# Patient Record
Sex: MALE | Race: BLACK OR AFRICAN AMERICAN | NOT HISPANIC OR LATINO | URBAN - METROPOLITAN AREA
[De-identification: names, ages, dates, MRNs, and addresses within clinical notes are randomized per-mention and may not be internally consistent; named-entity substitution may affect disease eponyms.]

---

## 2024-05-13 ENCOUNTER — APPOINTMENT (RX ONLY)
Dept: URBAN - METROPOLITAN AREA CLINIC 1 | Facility: CLINIC | Age: 64
Setting detail: DERMATOLOGY
End: 2024-05-13

## 2024-05-13 DIAGNOSIS — L80 VITILIGO: ICD-10-CM | Status: INADEQUATELY CONTROLLED

## 2024-05-13 DIAGNOSIS — L29.89 OTHER PRURITUS: ICD-10-CM

## 2024-05-13 DIAGNOSIS — L81.5 LEUKODERMA, NOT ELSEWHERE CLASSIFIED: ICD-10-CM | Status: STABLE

## 2024-05-13 DIAGNOSIS — L82.1 OTHER SEBORRHEIC KERATOSIS: ICD-10-CM | Status: STABLE

## 2024-05-13 DIAGNOSIS — L81.4 OTHER MELANIN HYPERPIGMENTATION: ICD-10-CM | Status: STABLE

## 2024-05-13 DIAGNOSIS — D22 MELANOCYTIC NEVI: ICD-10-CM | Status: STABLE

## 2024-05-13 DIAGNOSIS — D18.0 HEMANGIOMA: ICD-10-CM | Status: STABLE

## 2024-05-13 DIAGNOSIS — L29.8 OTHER PRURITUS: ICD-10-CM

## 2024-05-13 DIAGNOSIS — Z12.83 ENCOUNTER FOR SCREENING FOR MALIGNANT NEOPLASM OF SKIN: ICD-10-CM

## 2024-05-13 PROBLEM — D18.01 HEMANGIOMA OF SKIN AND SUBCUTANEOUS TISSUE: Status: ACTIVE | Noted: 2024-05-13

## 2024-05-13 PROBLEM — D22.71 MELANOCYTIC NEVI OF RIGHT LOWER LIMB, INCLUDING HIP: Status: ACTIVE | Noted: 2024-05-13

## 2024-05-13 PROBLEM — D22.72 MELANOCYTIC NEVI OF LEFT LOWER LIMB, INCLUDING HIP: Status: ACTIVE | Noted: 2024-05-13

## 2024-05-13 PROBLEM — D22.4 MELANOCYTIC NEVI OF SCALP AND NECK: Status: ACTIVE | Noted: 2024-05-13

## 2024-05-13 PROBLEM — D22.5 MELANOCYTIC NEVI OF TRUNK: Status: ACTIVE | Noted: 2024-05-13

## 2024-05-13 PROBLEM — D22.61 MELANOCYTIC NEVI OF RIGHT UPPER LIMB, INCLUDING SHOULDER: Status: ACTIVE | Noted: 2024-05-13

## 2024-05-13 PROCEDURE — ? TREATMENT REGIMEN

## 2024-05-13 PROCEDURE — ? PRESCRIPTION MEDICATION MANAGEMENT

## 2024-05-13 PROCEDURE — ? FULL BODY SKIN EXAM

## 2024-05-13 PROCEDURE — ? DIAGNOSIS COMMENT

## 2024-05-13 PROCEDURE — 99204 OFFICE O/P NEW MOD 45 MIN: CPT

## 2024-05-13 PROCEDURE — ? PRESCRIPTION

## 2024-05-13 PROCEDURE — ? COUNSELING

## 2024-05-13 RX ORDER — HYDROXYZINE HYDROCHLORIDE 25 MG/1
TABLET, FILM COATED ORAL
Qty: 30 | Refills: 0 | Status: ERX | COMMUNITY
Start: 2024-05-13

## 2024-05-13 RX ORDER — TACROLIMUS 1 MG/G
OINTMENT TOPICAL
Qty: 60 | Refills: 0 | Status: ERX | COMMUNITY
Start: 2024-05-13

## 2024-05-13 RX ADMIN — TACROLIMUS: 1 OINTMENT TOPICAL at 00:00

## 2024-05-13 RX ADMIN — HYDROXYZINE HYDROCHLORIDE: 25 TABLET, FILM COATED ORAL at 00:00

## 2024-05-13 ASSESSMENT — LOCATION SIMPLE DESCRIPTION DERM
LOCATION SIMPLE: RIGHT UPPER ARM
LOCATION SIMPLE: ABDOMEN
LOCATION SIMPLE: LEFT FOREARM
LOCATION SIMPLE: RIGHT POSTERIOR THIGH
LOCATION SIMPLE: LEFT UPPER ARM
LOCATION SIMPLE: LEFT PLANTAR SURFACE
LOCATION SIMPLE: RIGHT SHOULDER
LOCATION SIMPLE: SCALP
LOCATION SIMPLE: RIGHT ANTERIOR NECK
LOCATION SIMPLE: RIGHT THIGH
LOCATION SIMPLE: RIGHT PLANTAR SURFACE
LOCATION SIMPLE: RIGHT BUTTOCK
LOCATION SIMPLE: LEFT CHEEK
LOCATION SIMPLE: RIGHT PRETIBIAL REGION
LOCATION SIMPLE: LEFT PRETIBIAL REGION
LOCATION SIMPLE: CHEST
LOCATION SIMPLE: RIGHT UPPER BACK

## 2024-05-13 ASSESSMENT — LOCATION DETAILED DESCRIPTION DERM
LOCATION DETAILED: RIGHT LATERAL BUTTOCK
LOCATION DETAILED: LEFT ANTERIOR DISTAL UPPER ARM
LOCATION DETAILED: RIGHT ANTERIOR SHOULDER
LOCATION DETAILED: RIGHT ANTERIOR PROXIMAL UPPER ARM
LOCATION DETAILED: LEFT PROXIMAL DORSAL FOREARM
LOCATION DETAILED: RIGHT INFERIOR MEDIAL UPPER BACK
LOCATION DETAILED: RIGHT SUPERIOR PARIETAL SCALP
LOCATION DETAILED: EPIGASTRIC SKIN
LOCATION DETAILED: LEFT CENTRAL MALAR CHEEK
LOCATION DETAILED: RIGHT PROXIMAL POSTERIOR UPPER ARM
LOCATION DETAILED: RIGHT PROXIMAL PRETIBIAL REGION
LOCATION DETAILED: STERNAL NOTCH
LOCATION DETAILED: LEFT PROXIMAL PRETIBIAL REGION
LOCATION DETAILED: LEFT MEDIAL PLANTAR MIDFOOT
LOCATION DETAILED: LEFT LATERAL PLANTAR MIDFOOT
LOCATION DETAILED: RIGHT SUPERIOR ANTERIOR NECK
LOCATION DETAILED: LEFT INFERIOR CENTRAL MALAR CHEEK
LOCATION DETAILED: RIGHT BUTTOCK
LOCATION DETAILED: RIGHT DISTAL POSTERIOR THIGH
LOCATION DETAILED: LEFT DISTAL PRETIBIAL REGION
LOCATION DETAILED: RIGHT MEDIAL PLANTAR MIDFOOT
LOCATION DETAILED: RIGHT LATERAL PLANTAR MIDFOOT
LOCATION DETAILED: RIGHT ANTERIOR PROXIMAL THIGH

## 2024-05-13 ASSESSMENT — LOCATION ZONE DERM
LOCATION ZONE: ARM
LOCATION ZONE: TRUNK
LOCATION ZONE: LEG
LOCATION ZONE: SCALP
LOCATION ZONE: FEET
LOCATION ZONE: NECK
LOCATION ZONE: FACE

## 2024-05-13 ASSESSMENT — ITCH INTENSITY: HOW SEVERE IS YOUR ITCHING?: 2

## 2024-05-13 NOTE — PROCEDURE: PRESCRIPTION MEDICATION MANAGEMENT
Initiate Treatment: tacrolimus 0.1 % topical ointment \\nQuantity: 60.0 g  Days Supply: 30\\nSig: Apply to affected areas on lower extremity BID
Render In Strict Bullet Format?: No
Detail Level: Zone
Continue Regimen: hydroxyzine HCl 25 mg tablet \\nQuantity: 30.0 Tablet  Days Supply: 30\\nSig: Take one tablet at bedtime as needed for itching. can cause drowsiness.

## 2024-05-13 NOTE — PROCEDURE: DIAGNOSIS COMMENT
Comment: After showering turn off water , in shower dry off then apply lotion , then put clothes on immediately to try to avoid extreme temperatures differences to decrease experience of exacerbation of itching
Render Risk Assessment In Note?: no
Detail Level: Simple

## 2024-11-04 ENCOUNTER — APPOINTMENT (RX ONLY)
Dept: URBAN - METROPOLITAN AREA CLINIC 1 | Facility: CLINIC | Age: 64
Setting detail: DERMATOLOGY
End: 2024-11-04

## 2024-11-04 DIAGNOSIS — L29.89 OTHER PRURITUS: ICD-10-CM | Status: INADEQUATELY CONTROLLED

## 2024-11-04 DIAGNOSIS — L80 VITILIGO: ICD-10-CM | Status: INADEQUATELY CONTROLLED

## 2024-11-04 DIAGNOSIS — L85.3 XEROSIS CUTIS: ICD-10-CM | Status: STABLE

## 2024-11-04 DIAGNOSIS — L30.0 NUMMULAR DERMATITIS: ICD-10-CM | Status: INADEQUATELY CONTROLLED

## 2024-11-04 PROCEDURE — 99214 OFFICE O/P EST MOD 30 MIN: CPT

## 2024-11-04 PROCEDURE — ? COUNSELING

## 2024-11-04 PROCEDURE — ? TREATMENT REGIMEN

## 2024-11-04 PROCEDURE — ? DIAGNOSIS COMMENT

## 2024-11-04 PROCEDURE — ? PRESCRIPTION MEDICATION MANAGEMENT

## 2024-11-04 PROCEDURE — ? PRESCRIPTION

## 2024-11-04 RX ORDER — TRIAMCINOLONE ACETONIDE 1 MG/G
OINTMENT TOPICAL
Qty: 80 | Refills: 0 | Status: ACTIVE

## 2024-11-04 RX ORDER — RUXOLITINIB 15 MG/G
CREAM TOPICAL
Qty: 60 | Refills: 0 | Status: ACTIVE

## 2024-11-04 RX ADMIN — TRIAMCINOLONE ACETONIDE: 1 OINTMENT TOPICAL at 00:00

## 2024-11-04 ASSESSMENT — ITCH INTENSITY: HOW SEVERE IS YOUR ITCHING?: 10

## 2024-11-04 ASSESSMENT — LOCATION SIMPLE DESCRIPTION DERM
LOCATION SIMPLE: RIGHT POSTERIOR THIGH
LOCATION SIMPLE: LEFT FOREARM
LOCATION SIMPLE: RIGHT FOREARM

## 2024-11-04 ASSESSMENT — LOCATION ZONE DERM
LOCATION ZONE: LEG
LOCATION ZONE: ARM

## 2024-11-04 ASSESSMENT — LOCATION DETAILED DESCRIPTION DERM
LOCATION DETAILED: LEFT PROXIMAL DORSAL FOREARM
LOCATION DETAILED: RIGHT DISTAL POSTERIOR THIGH
LOCATION DETAILED: RIGHT DISTAL DORSAL FOREARM

## 2024-11-04 NOTE — PROCEDURE: TREATMENT REGIMEN
Plan: Claritin or Allegra QAM, pt is taking Claritin \\nSaran lotion as needed for itch \\nTake Zyrtec or Benadryl QHS\\nHydroxyzine made pt feel jittery\\nConsider doing work up for pruritus with labs if not improved on f/u
Detail Level: Zone
Initiate Treatment: Claritin or Allegra QAM, pt is taking Claritin \\nSaran lotion as needed for itch \\nTake Zyrtec or Benadryl QHS\\nHydroxyzine made pt feel jittery
Plan: Pt advised to use Cetaphil or CeraVe moisturizers daily

## 2024-11-04 NOTE — PROCEDURE: PRESCRIPTION MEDICATION MANAGEMENT
Discontinue Regimen: tacrolimus 0.1 % topical ointment \\nApply to affected areas on lower extremity BID
Initiate Treatment: Opzelura cream to AA of right leg BID
Render In Strict Bullet Format?: No
Detail Level: Zone
Discontinue Regimen: hydroxyzine HCl 25 mg tablet \\nTake one tablet at bedtime as needed for itching. can cause drowsiness.
Initiate Treatment: triamcinolone acetonide 0.1 % topical ointment \\nApply to scaly patch on arm twice daily

## 2024-11-04 NOTE — PROCEDURE: DIAGNOSIS COMMENT
Comment: Pt states the itch gets worse in the winter\\nPt advised to not take hot showers, to dry off after showers in the shower, moisturize in the shower and then put clothing on immediately  to avoid drastic temperature changes to minimize to itch
Render Risk Assessment In Note?: no
Detail Level: Simple